# Patient Record
Sex: MALE | URBAN - METROPOLITAN AREA
[De-identification: names, ages, dates, MRNs, and addresses within clinical notes are randomized per-mention and may not be internally consistent; named-entity substitution may affect disease eponyms.]

---

## 2019-07-15 ENCOUNTER — APPOINTMENT (RX ONLY)
Dept: URBAN - METROPOLITAN AREA CLINIC 151 | Facility: CLINIC | Age: 44
Setting detail: DERMATOLOGY
End: 2019-07-15

## 2019-07-15 DIAGNOSIS — L30.9 DERMATITIS, UNSPECIFIED: ICD-10-CM

## 2019-07-15 PROCEDURE — 99203 OFFICE O/P NEW LOW 30 MIN: CPT

## 2019-07-15 PROCEDURE — ? COUNSELING

## 2019-07-15 PROCEDURE — ? PRESCRIPTION

## 2019-07-15 RX ORDER — TRIAMCINOLONE ACETONIDE 1 MG/G
CREAM TOPICAL BID
Qty: 1 | Refills: 1 | Status: ERX | COMMUNITY
Start: 2019-07-15

## 2019-07-15 RX ORDER — PREDNISONE 10 MG/1
TABLET ORAL
Qty: 40 | Refills: 0 | Status: ERX | COMMUNITY
Start: 2019-07-15

## 2019-07-15 RX ADMIN — PREDNISONE: 10 TABLET ORAL at 14:38

## 2019-07-15 RX ADMIN — TRIAMCINOLONE ACETONIDE: 1 CREAM TOPICAL at 14:39

## 2019-07-15 NOTE — PROCEDURE: COUNSELING
Detail Level: Detailed
Patient Specific Counseling (Will Not Stick From Patient To Patient): - Discussed using prednisone, topical creams, or just doing nothing. Advised him to start prednisone if condition is not improving.

## 2019-07-15 NOTE — PROCEDURE: MIPS QUALITY
Quality 110: Preventive Care And Screening: Influenza Immunization: Influenza Immunization Administered during Influenza season
Quality 431: Preventive Care And Screening: Unhealthy Alcohol Use - Screening: Patient screened for unhealthy alcohol use using a single question and scores less than 2 times per year
Quality 130: Documentation Of Current Medications In The Medical Record: Current Medications Documented
Quality 131: Pain Assessment And Follow-Up: Pain assessment using a standardized tool is documented as negative, no follow-up plan required
Detail Level: Detailed
Quality 226: Preventive Care And Screening: Tobacco Use: Screening And Cessation Intervention: Patient screened for tobacco use and is an ex/non-smoker

## 2019-07-15 NOTE — HPI: RASH
Is This A New Presentation, Or A Follow-Up?: Rash
Additional History: Itchy red papules throughout body. Started last Monday after he was walking in a bushy area. Only used aloe Vera cream and Benadryl.

## 2023-02-27 ENCOUNTER — NEW PATIENT COMPREHENSIVE (OUTPATIENT)
Dept: URBAN - METROPOLITAN AREA CLINIC 45 | Facility: CLINIC | Age: 48
End: 2023-02-27

## 2023-02-27 DIAGNOSIS — H02.88B: ICD-10-CM

## 2023-02-27 DIAGNOSIS — H40.013: ICD-10-CM

## 2023-02-27 DIAGNOSIS — H02.88A: ICD-10-CM

## 2023-02-27 PROCEDURE — 99203 OFFICE O/P NEW LOW 30 MIN: CPT

## 2023-02-27 PROCEDURE — 76514 ECHO EXAM OF EYE THICKNESS: CPT

## 2023-02-27 PROCEDURE — 92250 FUNDUS PHOTOGRAPHY W/I&R: CPT

## 2023-02-27 ASSESSMENT — PACHYMETRY
OS_CT_UM: 453
OD_CT_UM: 451

## 2023-02-27 ASSESSMENT — TONOMETRY
OD_IOP_MMHG: 08
OS_IOP_MMHG: 08

## 2023-02-27 ASSESSMENT — KERATOMETRY
OD_K2POWER_DIOPTERS: 46.75
OD_AXISANGLE_DEGREES: 12
OS_K1POWER_DIOPTERS: 40.75
OS_K2POWER_DIOPTERS: 50.75
OD_AXISANGLE2_DEGREES: 102
OS_AXISANGLE_DEGREES: 124
OD_K1POWER_DIOPTERS: 46.25
OS_AXISANGLE2_DEGREES: 34

## 2023-02-27 ASSESSMENT — VISUAL ACUITY
OS_SC: 20/20
OU_SC: 20/20
OD_SC: 20/20

## 2023-03-22 ENCOUNTER — DIAGNOSTICS ONLY (OUTPATIENT)
Dept: URBAN - METROPOLITAN AREA CLINIC 45 | Facility: CLINIC | Age: 48
End: 2023-03-22

## 2023-03-22 DIAGNOSIS — H40.013: ICD-10-CM

## 2023-03-22 PROCEDURE — 92083 EXTENDED VISUAL FIELD XM: CPT

## 2023-03-22 PROCEDURE — 92133 CPTRZD OPH DX IMG PST SGM ON: CPT

## 2023-03-22 ASSESSMENT — KERATOMETRY
OS_K1POWER_DIOPTERS: 40.75
OD_AXISANGLE2_DEGREES: 102
OS_AXISANGLE_DEGREES: 124
OD_K2POWER_DIOPTERS: 46.75
OD_K1POWER_DIOPTERS: 46.25
OD_AXISANGLE_DEGREES: 12
OS_K2POWER_DIOPTERS: 50.75
OS_AXISANGLE2_DEGREES: 34

## 2023-05-31 ENCOUNTER — PROBLEM (OUTPATIENT)
Dept: URBAN - METROPOLITAN AREA CLINIC 45 | Facility: CLINIC | Age: 48
End: 2023-05-31

## 2023-05-31 DIAGNOSIS — H02.88A: ICD-10-CM

## 2023-05-31 DIAGNOSIS — H02.88B: ICD-10-CM

## 2023-05-31 PROCEDURE — 99213 OFFICE O/P EST LOW 20 MIN: CPT

## 2023-05-31 ASSESSMENT — KERATOMETRY
OD_K1POWER_DIOPTERS: 46.25
OD_AXISANGLE2_DEGREES: 102
OS_K1POWER_DIOPTERS: 40.75
OD_K2POWER_DIOPTERS: 46.75
OS_AXISANGLE_DEGREES: 124
OD_AXISANGLE_DEGREES: 12
OS_K2POWER_DIOPTERS: 50.75
OS_AXISANGLE2_DEGREES: 34

## 2023-05-31 ASSESSMENT — TONOMETRY
OS_IOP_MMHG: 12
OD_IOP_MMHG: 12

## 2023-05-31 ASSESSMENT — VISUAL ACUITY
OD_SC: 20/20-1
OS_SC: 20/20

## 2023-10-25 ENCOUNTER — IOP CHECK (OUTPATIENT)
Dept: URBAN - METROPOLITAN AREA CLINIC 45 | Facility: CLINIC | Age: 48
End: 2023-10-25

## 2023-10-25 DIAGNOSIS — H40.013: ICD-10-CM

## 2023-10-25 DIAGNOSIS — H02.88B: ICD-10-CM

## 2023-10-25 DIAGNOSIS — H02.88A: ICD-10-CM

## 2023-10-25 PROCEDURE — 99213 OFFICE O/P EST LOW 20 MIN: CPT

## 2023-10-25 PROCEDURE — 92133 CPTRZD OPH DX IMG PST SGM ON: CPT

## 2023-10-25 PROCEDURE — 92020 GONIOSCOPY: CPT

## 2023-10-25 ASSESSMENT — VISUAL ACUITY
OD_SC: 20/20
OS_SC: 20/20-1

## 2023-10-25 ASSESSMENT — TONOMETRY
OD_IOP_MMHG: 10
OS_IOP_MMHG: 10

## 2024-04-10 ENCOUNTER — ESTABLISHED COMPREHENSIVE EXAM (OUTPATIENT)
Dept: URBAN - METROPOLITAN AREA CLINIC 45 | Facility: CLINIC | Age: 49
End: 2024-04-10

## 2024-04-10 DIAGNOSIS — H02.88A: ICD-10-CM

## 2024-04-10 DIAGNOSIS — H02.88B: ICD-10-CM

## 2024-04-10 DIAGNOSIS — J30.0: ICD-10-CM

## 2024-04-10 DIAGNOSIS — H40.013: ICD-10-CM

## 2024-04-10 PROCEDURE — 92133 CPTRZD OPH DX IMG PST SGM ON: CPT

## 2024-04-10 PROCEDURE — 92014 COMPRE OPH EXAM EST PT 1/>: CPT

## 2024-04-10 PROCEDURE — 92083 EXTENDED VISUAL FIELD XM: CPT

## 2024-04-10 ASSESSMENT — TONOMETRY
OS_IOP_MMHG: 13
OD_IOP_MMHG: 16
OD_IOP_MMHG: 13
OS_IOP_MMHG: 15

## 2024-04-10 ASSESSMENT — VISUAL ACUITY
OS_SC: 20/20-2
OD_SC: 20/20

## 2025-02-26 ENCOUNTER — ESTABLISHED COMPREHENSIVE EXAM (OUTPATIENT)
Dept: URBAN - METROPOLITAN AREA CLINIC 45 | Facility: CLINIC | Age: 50
End: 2025-02-26

## 2025-02-26 DIAGNOSIS — J30.0: ICD-10-CM

## 2025-02-26 DIAGNOSIS — H40.013: ICD-10-CM

## 2025-02-26 DIAGNOSIS — H02.88A: ICD-10-CM

## 2025-02-26 DIAGNOSIS — H02.88B: ICD-10-CM

## 2025-02-26 PROCEDURE — 92133 CPTRZD OPH DX IMG PST SGM ON: CPT

## 2025-02-26 PROCEDURE — 92014 COMPRE OPH EXAM EST PT 1/>: CPT

## 2025-02-26 ASSESSMENT — VISUAL ACUITY
OD_SC: 20/20
OS_SC: 20/20-1

## 2025-02-26 ASSESSMENT — TONOMETRY
OD_IOP_MMHG: 15
OS_IOP_MMHG: 15

## 2025-06-26 ENCOUNTER — APPOINTMENT (OUTPATIENT)
Dept: URBAN - METROPOLITAN AREA CLINIC 151 | Facility: CLINIC | Age: 50
Setting detail: DERMATOLOGY
End: 2025-06-26

## 2025-06-26 VITALS — WEIGHT: 230 LBS | HEIGHT: 73 IN

## 2025-06-26 PROBLEM — D22.9 MELANOCYTIC NEVI, UNSPECIFIED: Status: ACTIVE | Noted: 2025-06-26

## 2025-06-26 PROCEDURE — ? COUNSELING

## 2025-06-26 PROCEDURE — ? PHOTO-DOCUMENTATION

## 2025-06-26 PROCEDURE — ? DIAGNOSIS COMMENT

## 2025-06-26 NOTE — PROCEDURE: DIAGNOSIS COMMENT
Comment: Reassured today. Recommended recheck if returns.
Detail Level: Simple
Render Risk Assessment In Note?: no
Comment: Lesion is very small and banal appearing and per report has decreased in size dramatically. Rec recheck given change in lesion but suspicion for CA is low given size and apparent decrease in size and prominence.